# Patient Record
Sex: FEMALE | Race: WHITE | Employment: PART TIME | URBAN - METROPOLITAN AREA
[De-identification: names, ages, dates, MRNs, and addresses within clinical notes are randomized per-mention and may not be internally consistent; named-entity substitution may affect disease eponyms.]

---

## 2019-03-04 ENCOUNTER — HOSPITAL ENCOUNTER (EMERGENCY)
Facility: CLINIC | Age: 39
Discharge: HOME | End: 2019-03-04
Attending: EMERGENCY MEDICINE | Admitting: EMERGENCY MEDICINE

## 2019-03-04 ENCOUNTER — APPOINTMENT (OUTPATIENT)
Dept: GENERAL RADIOLOGY | Facility: CLINIC | Age: 39
End: 2019-03-04
Attending: EMERGENCY MEDICINE

## 2019-03-04 VITALS
TEMPERATURE: 98.6 F | OXYGEN SATURATION: 95 % | HEART RATE: 64 BPM | SYSTOLIC BLOOD PRESSURE: 119 MMHG | DIASTOLIC BLOOD PRESSURE: 78 MMHG | RESPIRATION RATE: 16 BRPM | WEIGHT: 208 LBS

## 2019-03-04 DIAGNOSIS — S63.502A WRIST SPRAIN, LEFT, INITIAL ENCOUNTER: ICD-10-CM

## 2019-03-04 PROCEDURE — 99284 EMERGENCY DEPT VISIT MOD MDM: CPT | Mod: Z6 | Performed by: EMERGENCY MEDICINE

## 2019-03-04 PROCEDURE — 73110 X-RAY EXAM OF WRIST: CPT | Mod: TC,LT

## 2019-03-04 PROCEDURE — 99284 EMERGENCY DEPT VISIT MOD MDM: CPT | Performed by: EMERGENCY MEDICINE

## 2019-03-04 PROCEDURE — 29125 APPL SHORT ARM SPLINT STATIC: CPT | Performed by: EMERGENCY MEDICINE

## 2019-03-04 NOTE — ED PROVIDER NOTES
History     Chief Complaint   Patient presents with     Arm Pain     The history is provided by the patient.     Ivana Scott is a 38 year old female who presents to the emergency department for wrist pain. Patient reports falling on 2/23/19 and injuring left wrist. She states she slipped and fell in a customers garage and injured her left wrist. She has not been seen or had an xray done yet. She has pain in her left wrist, unable to turn, twist or bend her wrist without any pain. When she turns her wrist she feels a clicking and grinding in her wrist. She has been icing it and wearing a wrist splint at night. She states she did fracture this wrist about 23 years ago, denies needing surgery at that time.    Allergies:  No Known Allergies    Problem List:    There are no active problems to display for this patient.       Past Medical History:    No past medical history on file.    Past Surgical History:    No past surgical history on file.    Family History:    No family history on file.    Social History:  Marital Status:    Social History     Tobacco Use     Smoking status: Not on file   Substance Use Topics     Alcohol use: Not on file     Drug use: Not on file        Medications:      No current outpatient medications on file.      Review of Systems   All other systems reviewed and are negative.      Physical Exam   BP: 119/78  Pulse: 64  Temp: 98.6  F (37  C)  Resp: 16  Weight: 94.3 kg (208 lb)  SpO2: 95 %      Physical Exam   Constitutional: She is oriented to person, place, and time. She appears well-developed and well-nourished. No distress.   HENT:   Head: Normocephalic and atraumatic.   Eyes: Pupils are equal, round, and reactive to light. No scleral icterus.   Neck: Normal range of motion. Neck supple.   Cardiovascular: Normal rate.   Pulmonary/Chest: Effort normal.   Musculoskeletal: She exhibits tenderness.   Diffuse tenderness around wrist, distal radius and ulna. Tenderness throughout all  carpal bones. Distal CMS intact.   Neurological: She is alert and oriented to person, place, and time.   Skin: Skin is warm and dry. No rash noted. She is not diaphoretic. No erythema. No pallor.   Psychiatric: She has a normal mood and affect. Her behavior is normal. Thought content normal.   Nursing note and vitals reviewed.      ED Course        Procedures               Critical Care time:  none               Results for orders placed or performed during the hospital encounter of 03/04/19 (from the past 24 hour(s))   XR Wrist Left 3 Views    Narrative    WRIST LEFT THREE OR MORE VIEWS   3/4/2019 1:42 PM     HISTORY: Trauma.    COMPARISON: None.      Impression    IMPRESSION: Three views left wrist. No fracture or dislocation. No  significant soft tissue swelling.    ANNA BARBOZA MD       Medications - No data to display    Assessments & Plan (with Medical Decision Making)  38-year-old female with sprain to the left wrist.  X-rays negative.  Given a splint.  Should wear this except to bathe.  Referred to sports medicine if not improving over the next week.     I have reviewed the nursing notes.    I have reviewed the findings, diagnosis, plan and need for follow up with the patient.          Medication List      There are no discharge medications for this visit.         Final diagnoses:   Wrist sprain, left, initial encounter     This document serves as a record of services personally performed by Gary Byrd MD. It was created on their behalf by Prisca Santiago, a trained medical scribe. The creation of this record is based on the provider's personal observations and the statements of the patient. This document has been checked and approved by the attending provider.    Note: Chart documentation done in part with Dragon Voice Recognition software. Although reviewed after completion, some word and grammatical errors may remain.    3/4/2019   [facility] EMERGENCY DEPARTMENT     Gary Byrd MD  03/04/19  1933

## 2019-03-04 NOTE — ED AVS SNAPSHOT
[facility] Emergency Department  [address] [place] [address]  Phone:  [phone]  Fax:  [phone]                                    Ivana Scott   MRN: [id number]    Department:  [facility] Emergency Department   Date of Visit:  3/4/2019           After Visit Summary Signature Page    I have received my discharge instructions, and my questions have been answered. I have discussed any challenges I see with this plan with the nurse or doctor.    ..........................................................................................................................................  Patient/Patient Representative Signature      ..........................................................................................................................................  Patient Representative Print Name and Relationship to Patient    ..................................................               ................................................  Date                                   Time    ..........................................................................................................................................  Reviewed by Signature/Title    ...................................................              ..............................................  Date                                               Time          22EPIC Rev 08/18

## 2019-03-04 NOTE — DISCHARGE INSTRUCTIONS
Wear wrist brace at all times except to bathe as long as having any pain.  Aleve up to 2 pills 2x/day as needed for pain.